# Patient Record
Sex: FEMALE | Race: BLACK OR AFRICAN AMERICAN | NOT HISPANIC OR LATINO | URBAN - METROPOLITAN AREA
[De-identification: names, ages, dates, MRNs, and addresses within clinical notes are randomized per-mention and may not be internally consistent; named-entity substitution may affect disease eponyms.]

---

## 2017-09-10 ENCOUNTER — EMERGENCY (EMERGENCY)
Facility: HOSPITAL | Age: 37
LOS: 0 days | Discharge: HOME | End: 2017-09-10
Attending: EMERGENCY MEDICINE
Payer: SELF-PAY

## 2017-09-10 VITALS
DIASTOLIC BLOOD PRESSURE: 90 MMHG | OXYGEN SATURATION: 99 % | SYSTOLIC BLOOD PRESSURE: 149 MMHG | HEART RATE: 78 BPM | TEMPERATURE: 99 F | WEIGHT: 210.1 LBS | RESPIRATION RATE: 18 BRPM | HEIGHT: 68 IN

## 2017-09-10 DIAGNOSIS — R51 HEADACHE: ICD-10-CM

## 2017-09-10 DIAGNOSIS — I10 ESSENTIAL (PRIMARY) HYPERTENSION: ICD-10-CM

## 2017-09-10 PROCEDURE — 99284 EMERGENCY DEPT VISIT MOD MDM: CPT

## 2017-09-10 NOTE — ED PROVIDER NOTE - MEDICAL DECISION MAKING DETAILS
pt appears well, states she doesn't want any further work up and feels comfortable now that her sbp is lower. denies headache right now and doesn't want any meds for it. states she wishes to gohome and will f/u w her pmd. pt appears well and non-toxic. . VSS. Sx have improved during ED stay. No acute events during ED observation period. Precautions given to return to the ED if sx persist or change. Pt expresses understanding and has no further questions. Pt feels comfortable and wishes to be discharged home. Instructed to follow up with PMD in 24 hrs.

## 2017-09-10 NOTE — ED ADULT TRIAGE NOTE - CHIEF COMPLAINT QUOTE
"headaches" reports having headaches for one week, with blood pressures above 170, states not having history of htn.

## 2017-09-10 NOTE — ED PROVIDER NOTE - PRESENTING SYMPTOMS DETAILS
37F no pmhx/shx comes in w concerns for high bp. states since yesterday noticed her -170s. she checked it b/c she was having intermittent gen headache past 1-2 wks related to stress which subsides, gradual onset, no focal weakness/numbness. currently no headache. came in b/c of the blood pressure. currently otherwise feeling well.  ROS: No fever/chills, No photophobia/eye pain/changes in vision, No ear pain/sore throat/dysphagia, No chest pain/palpitations, no SOB/cough/wheeze/stridor, No abdominal pain/N/V/D, no dysuria/frequency/discharge, No neck/back pain, no rash, no changes in neurological status/function.

## 2019-12-18 NOTE — ED ADULT NURSE NOTE - NS ED NURSE RECORD ANOTHER HT AND WT
Patient is asking for a refill of tramadol to be sent on 12/23- states she last filled this on 11/25 and cannot fill on the holidays  Also asking for additional refills of lamictal to be sent on the same day  States she has no refills remaining on this  Is not out of medication  She would like a 30 day supply with refills  Yes

## 2022-07-14 NOTE — ED ADULT NURSE NOTE - TEMPLATE LIST FOR HEAD TO TOE ASSESSMENT
General Chonodrocutaneous Helical Advancement Flap Text: The defect edges were debeveled with a #15 scalpel blade.  Given the location of the defect and the proximity to free margins a chondrocutaneous helical advancement flap was deemed most appropriate.  Using a sterile surgical marker, the appropriate advancement flap was drawn incorporating the defect and placing the expected incisions within the relaxed skin tension lines where possible.    The area thus outlined was incised deep to adipose tissue with a #15 scalpel blade.  The skin margins were undermined to an appropriate distance in all directions utilizing iris scissors.